# Patient Record
Sex: FEMALE | Race: WHITE | Employment: UNEMPLOYED | URBAN - METROPOLITAN AREA
[De-identification: names, ages, dates, MRNs, and addresses within clinical notes are randomized per-mention and may not be internally consistent; named-entity substitution may affect disease eponyms.]

---

## 2022-11-22 ENCOUNTER — CLINICAL DOCUMENTATION (OUTPATIENT)
Dept: PSYCHIATRY | Age: 32
End: 2022-11-22

## 2022-11-22 NOTE — PROGRESS NOTES
Patient had [facility] for new patient appointment on 11/22/2022 with [facility]. Per policy for new patient [facility]- I will dismiss this patient and block from making appointment with me in the future.